# Patient Record
Sex: FEMALE | Race: WHITE | NOT HISPANIC OR LATINO | Employment: UNEMPLOYED | URBAN - METROPOLITAN AREA
[De-identification: names, ages, dates, MRNs, and addresses within clinical notes are randomized per-mention and may not be internally consistent; named-entity substitution may affect disease eponyms.]

---

## 2018-03-08 ENCOUNTER — HOSPITAL ENCOUNTER (EMERGENCY)
Facility: HOSPITAL | Age: 38
Discharge: HOME | End: 2018-03-08
Attending: EMERGENCY MEDICINE

## 2018-03-08 VITALS
RESPIRATION RATE: 16 BRPM | DIASTOLIC BLOOD PRESSURE: 63 MMHG | TEMPERATURE: 99 F | HEART RATE: 72 BPM | SYSTOLIC BLOOD PRESSURE: 130 MMHG | WEIGHT: 145 LBS | OXYGEN SATURATION: 95 %

## 2018-03-08 DIAGNOSIS — S61.511A LACERATION OF RIGHT WRIST, INITIAL ENCOUNTER: Primary | ICD-10-CM

## 2018-03-08 PROCEDURE — 12001 RPR S/N/AX/GEN/TRNK 2.5CM/<: CPT

## 2018-03-08 PROCEDURE — 90471 IMMUNIZATION ADMIN: CPT | Performed by: PHYSICIAN ASSISTANT

## 2018-03-08 PROCEDURE — 99283 EMERGENCY DEPT VISIT LOW MDM: CPT | Mod: 25

## 2018-03-08 PROCEDURE — 90715 TDAP VACCINE 7 YRS/> IM: CPT | Performed by: PHYSICIAN ASSISTANT

## 2018-03-08 PROCEDURE — 63600175 PHARM REV CODE 636 W HCPCS: Performed by: PHYSICIAN ASSISTANT

## 2018-03-08 PROCEDURE — 25000003 PHARM REV CODE 250: Performed by: PHYSICIAN ASSISTANT

## 2018-03-08 RX ORDER — METFORMIN HYDROCHLORIDE 500 MG/1
500 TABLET ORAL 2 TIMES DAILY WITH MEALS
COMMUNITY

## 2018-03-08 RX ORDER — LIDOCAINE HYDROCHLORIDE AND EPINEPHRINE 10; 10 MG/ML; UG/ML
10 INJECTION, SOLUTION INFILTRATION; PERINEURAL
Status: COMPLETED | OUTPATIENT
Start: 2018-03-08 | End: 2018-03-08

## 2018-03-08 RX ORDER — BACITRACIN 500 [USP'U]/G
OINTMENT TOPICAL
Status: COMPLETED | OUTPATIENT
Start: 2018-03-08 | End: 2018-03-08

## 2018-03-08 RX ORDER — ENALAPRIL MALEATE 10 MG/1
10 TABLET ORAL DAILY
COMMUNITY

## 2018-03-08 RX ORDER — ASPIRIN 81 MG/1
81 TABLET ORAL DAILY
COMMUNITY

## 2018-03-08 RX ORDER — GLIMEPIRIDE 2 MG/1
2 TABLET ORAL
COMMUNITY

## 2018-03-08 RX ORDER — SIMVASTATIN 20 MG/1
20 TABLET, FILM COATED ORAL NIGHTLY
COMMUNITY

## 2018-03-08 RX ADMIN — CLOSTRIDIUM TETANI TOXOID ANTIGEN (FORMALDEHYDE INACTIVATED), CORYNEBACTERIUM DIPHTHERIAE TOXOID ANTIGEN (FORMALDEHYDE INACTIVATED), BORDETELLA PERTUSSIS TOXOID ANTIGEN (GLUTARALDEHYDE INACTIVATED), BORDETELLA PERTUSSIS FILAMENTOUS HEMAGGLUTININ ANTIGEN (FORMALDEHYDE INACTIVATED), BORDETELLA PERTUSSIS PERTACTIN ANTIGEN, AND BORDETELLA PERTUSSIS FIMBRIAE 2/3 ANTIGEN 0.5 ML: 5; 2; 2.5; 5; 3; 5 INJECTION, SUSPENSION INTRAMUSCULAR at 06:03

## 2018-03-08 RX ADMIN — LIDOCAINE HYDROCHLORIDE AND EPINEPHRINE 10 ML: 10; 10 INJECTION, SOLUTION INFILTRATION; PERINEURAL at 06:03

## 2018-03-08 RX ADMIN — BACITRACIN: 500 OINTMENT TOPICAL at 06:03

## 2018-03-09 NOTE — ED TRIAGE NOTES
Pt arrived to ED with her sister.  used: pt's sister. Her sister states that patient got cut on her right wrist while cleaning .

## 2018-03-09 NOTE — ED PROVIDER NOTES
Encounter Date: 3/8/2018    SCRIBE #1 NOTE: I, Koffi Rice, am scribing for, and in the presence of, Carmen Gonzáles PA-C. Other sections scribed: HPI, ROS.       History     Chief Complaint   Patient presents with    Laceration     Laceration to right wrist from cleaning .     CC: Laceration  HPI: This 37 y.o. female with Hx of HTN and DM type II presents to the ED c/o 1.5 cm laceration to R medial wrist she sustained 1 hour PTA while cleaning out a [employer] . Pt reports mild-to-moderate associated pain to area. Bleeding was controlled PTA with pressure dressing. Pt does not believe she is up to date on her Tdap vaccination. Pt denies any numbness or weakness to hand, fever, chills.        The history is provided by the patient. The history is limited by a language barrier (Pashto). A  was used (sister).     Review of patient's allergies indicates:  No Known Allergies  Past Medical History:   Diagnosis Date    Diabetes mellitus     Hypertension      History reviewed. No pertinent surgical history.  History reviewed. No pertinent family history.  Social History   Substance Use Topics    Smoking status: Never Smoker    Smokeless tobacco: Not on file    Alcohol use No     Review of Systems   Constitutional: Negative for chills, diaphoresis, fatigue and fever.   HENT: Negative for congestion, ear pain, rhinorrhea and sore throat.    Eyes: Negative for pain.   Respiratory: Negative for cough.    Cardiovascular: Negative for chest pain.   Gastrointestinal: Negative for abdominal pain, constipation, diarrhea, nausea and vomiting.   Genitourinary: Negative for difficulty urinating, dysuria, flank pain, urgency, vaginal bleeding and vaginal discharge.   Musculoskeletal: Negative for back pain and neck pain.        (+) R wrist pain   Skin: Positive for wound (1.5 cm laceration to R medial wrist). Negative for pallor and rash.   Neurological: Negative for weakness and numbness.    Psychiatric/Behavioral: Negative for confusion.       Physical Exam     Initial Vitals [03/08/18 1752]   BP Pulse Resp Temp SpO2   (!) 143/70 69 16 98.2 °F (36.8 °C) 99 %      MAP       94.33         Physical Exam    Nursing note and vitals reviewed.  Constitutional: Vital signs are normal. She appears well-developed and well-nourished. She is not diaphoretic. She is cooperative.  Non-toxic appearance. She does not have a sickly appearance. She does not appear ill. No distress.   HENT:   Head: Normocephalic and atraumatic.   Right Ear: Tympanic membrane, external ear and ear canal normal.   Left Ear: Tympanic membrane, external ear and ear canal normal.   Nose: Nose normal.   Mouth/Throat: Uvula is midline, oropharynx is clear and moist and mucous membranes are normal. No trismus in the jaw. No uvula swelling. No oropharyngeal exudate, posterior oropharyngeal edema or posterior oropharyngeal erythema.   Eyes: Conjunctivae, EOM and lids are normal. Pupils are equal, round, and reactive to light.   Neck: Trachea normal, normal range of motion, full passive range of motion without pain and phonation normal. Neck supple.   Cardiovascular: Normal rate, regular rhythm, normal heart sounds and intact distal pulses. Exam reveals no gallop and no friction rub.    No murmur heard.  Pulses:       Radial pulses are 2+ on the right side, and 2+ on the left side.   Capillary refill less than 2 seconds in bilateral upper extremities.   Pulmonary/Chest: Effort normal and breath sounds normal. No respiratory distress. She has no decreased breath sounds. She has no wheezes. She has no rhonchi. She has no rales.   Abdominal: Soft. Normal appearance and bowel sounds are normal. She exhibits no distension and no mass. There is no tenderness. There is no rigidity, no rebound and no guarding.   Musculoskeletal: Normal range of motion.        Right elbow: Normal.       Right wrist: Normal.        Right forearm: She exhibits laceration.  She exhibits no tenderness, no bony tenderness, no swelling, no edema and no deformity.   Neurological: She is alert and oriented to person, place, and time. She has normal strength. No cranial nerve deficit or sensory deficit. GCS eye subscore is 4. GCS verbal subscore is 5. GCS motor subscore is 6.   Skin: Skin is warm and dry. Capillary refill takes less than 2 seconds. Laceration noted. No rash and no abscess noted.        Psychiatric: She has a normal mood and affect. Her speech is normal and behavior is normal. Judgment and thought content normal. Cognition and memory are normal.         ED Course   Lac Repair  Date/Time: 3/8/2018 9:11 PM  Performed by: SIRIA MOREIRA  Authorized by: CASTILLO MALIK   Consent Done: Yes  Consent: Verbal consent obtained.  Risks and benefits: risks, benefits and alternatives were discussed  Consent given by: patient  Patient understanding: patient states understanding of the procedure being performed  Patient consent: the patient's understanding of the procedure matches consent given  Patient identity confirmed: verbally with patient  Body area: upper extremity  Location details: right wrist  Laceration length: 1.5 cm  Foreign bodies: no foreign bodies  Tendon involvement: none  Nerve involvement: none  Vascular damage: no  Anesthesia: local infiltration    Anesthesia:  Local Anesthetic: lidocaine 1% with epinephrine  Anesthetic total: 2 mL  Patient sedated: no  Irrigation solution: saline  Irrigation method: jet lavage  Amount of cleaning: standard  Debridement: none  Degree of undermining: none  Skin closure: 5-0 Prolene  Number of sutures: 4  Technique: simple  Approximation: close  Approximation difficulty: simple  Dressing: antibiotic ointment and pressure/compression dressing  Patient tolerance: Patient tolerated the procedure well with no immediate complications        Labs Reviewed - No data to display          Medical Decision Making:   Initial Assessment:    This is an evaluation of a 37 y.o. female that presents to the Emergency Department for laceration. Patient is visiting her sister from [place] and her sister is her . 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 She reports she is right hand dominant. She denies any numbness, weakness or decreased ROM of the upper extremity. She denies any attempted treatment prior to arrival. She is unsure of her tetanus status.     ED Management:  Physical Exam shows a non-toxic, afebrile, and well appearing female. There is a superficial laceration on the right anterior wrist that is 1.5 cm that is easily approximated. No vascular damage. No tendon or bony exposure.    Vital Signs Are Reassuring. If available, previous records reviewed.     My overall impression is Laceration. I considered, but at this time, do not suspect abscess, cellulitis, foreign body.    ED Course: Tdap. Laceration repair. Patient tolerated procedure well with no immediate complications. Bandage applied to wound. D/C Meds: Bacitracin oint. Additional D/C Information: wound care instructions given. Instructed patient to follow-up with PCP or this ED for suture removal in 7-10 days. The diagnosis, treatment plan, instructions for follow-up and reevaluation with PCP, as well as ED return precautions were discussed and understanding was verbalized. All questions or concerns have been addressed. Patient was discharged home with an instructional sheet which gave not only information regarding the most likely diagnoses but also information regarding when to return to the emergency department for alarming symptoms and when to seek further care.        Other:   I have discussed this case with another health care provider.       <> Summary of the Discussion:   This case was discussed with Dr. Peraza who is in agreement with my assessment and plan.     PHYLICIA Friedmanibe Attestation:   Scribe #1: I  performed the above scribed service and the documentation accurately describes the services I performed. I attest to the accuracy of the note.    Attending Attestation:           Physician Attestation for Scribe:  Physician Attestation Statement for Scribe #1: I, Carmen Stevens PA-C, reviewed documentation, as scribed by Koffi Rice in my presence, and it is both accurate and complete.                    Clinical Impression:   The encounter diagnosis was Laceration of right wrist, initial encounter.    Disposition:   Disposition: Discharged  Condition: Stable                        Carmen Stevens PA-C  03/08/18 [id number]

## 2018-03-09 NOTE — DISCHARGE INSTRUCTIONS
Please keep your wound clean and dry.  Wash gently with soap and water and apply antibiotic ointment (bacitracin, neosporin, etc.) over the wound after washing. Please watch for signs of infection including: increased\spreading redness, swelling, pus-like discharge, or a fever greater than 100.4F. If you experience any of these, please contact your Primary Care Doctor or Return to the Emergency Department for a wound check.     Please follow up with your Primary Care Doctor in 7-10 days for wound recheck and suture removal.  You may return to the Emergency Department if you are unable to see your Primary Care Doctor.  Please return to the ER for any new or worsening symptoms.

## 2018-03-17 ENCOUNTER — HOSPITAL ENCOUNTER (EMERGENCY)
Facility: HOSPITAL | Age: 38
Discharge: HOME | End: 2018-03-17
Attending: EMERGENCY MEDICINE

## 2018-03-17 VITALS
HEIGHT: 61 IN | HEART RATE: 72 BPM | TEMPERATURE: 98 F | BODY MASS INDEX: 27.38 KG/M2 | WEIGHT: 145 LBS | OXYGEN SATURATION: 99 % | RESPIRATION RATE: 18 BRPM | SYSTOLIC BLOOD PRESSURE: 138 MMHG | DIASTOLIC BLOOD PRESSURE: 72 MMHG

## 2018-03-17 DIAGNOSIS — Z48.02 VISIT FOR SUTURE REMOVAL: Primary | ICD-10-CM

## 2018-03-17 PROCEDURE — 99281 EMR DPT VST MAYX REQ PHY/QHP: CPT

## 2018-03-18 NOTE — ED PROVIDER NOTES
Encounter Date: 3/17/2018    SCRIBE #1 NOTE: I, Yoon Mancuso, am scribing for, and in the presence of,  Shaan Nj MD. I have scribed the following portions of the note - Other sections scribed: HPI, ROS, PE.       History     Chief Complaint   Patient presents with    Suture / Staple Removal     Pt is here to have stiches removed (was here on March 8, 2018).     CC: Suture Removal    HPI: 37 year old female with DM and HTN presents to the ED for suture removal from the R forearm that was placed 9 days ago on 3/8/18. Pt reports she sustained the laceration while cleaning a [employer] . No further complaints reported. Pt denies fever, swelling to the suture site, or purulent drainage.      The history is provided by the patient. No  was used.     Review of patient's allergies indicates:  No Known Allergies  Past Medical History:   Diagnosis Date    Diabetes mellitus     Hypertension      History reviewed. No pertinent surgical history.  History reviewed. No pertinent family history.  Social History   Substance Use Topics    Smoking status: Never Smoker    Smokeless tobacco: Not on file    Alcohol use No     Review of Systems   Constitutional: Negative for chills, diaphoresis and fever.   HENT: Negative for ear pain and sore throat.    Eyes: Negative for photophobia and visual disturbance.   Respiratory: Negative for cough and shortness of breath.    Cardiovascular: Negative for chest pain.   Gastrointestinal: Negative for abdominal pain, diarrhea, nausea and vomiting.   Genitourinary: Negative for dysuria.   Musculoskeletal: Negative for back pain.   Skin: Negative for rash.        (+) sutures placed to the R forearm   Neurological: Negative for headaches.   All other systems reviewed and are negative.      Physical Exam     Initial Vitals [03/17/18 2055]   BP Pulse Resp Temp SpO2   122/73 95 16 98 °F (36.7 °C) 97 %      MAP       89.33         Physical Exam   PHYSICAL EXAM  Vital  signs and nurse note reviewed.    GEN:  NAD, A & O X 3, atraumatic, well appearing, nontoxic  HEENT:   PERRLA, EOMI, nl conjunciva, no node/nodule, neck supple, no rigidity, moist membranes  CV:   2+ radial pulses, <2 sec cap refill, no obvious JVD  CHEST/Resp: equal and bilateral chest rise, no obvious wheezing  ABD:     Soft, nondistended  EXT:   +3cm scab with 3 blue sutures placed to the R dorsal distal surface of the R forearm, MARQUEZ x 4, FROM, no swelling  NEURO:  CN II-XII grossly intact, no obvious motor/sensory deficit  PSYCH:  no SI/HI, no anxiety, nl mood/affect  SKIN:  (see EXT) Warm, dry, intact, no rashes/lesions or masses        ED Course   Procedures  Labs Reviewed - No data to display          Medical Decision Making:   Initial Assessment:   37-year-old woman who had stitches placed for laceration on a  90s ago presents for suture removal.  ED Management:  Treatment plan includes physical exam,  and supportive care.    Patient improved after treatment sutures removed and tolerating PO.    Family and patient updated on care.  Pt agrees with assessment, disposition and treatment plan and has no further questions or complaints at this time. Given return precautions and demonstrates understanding.    Patient will  follow up with primary care physician as an outpatient.  Prescription given: NONE            Scribe Attestation:   Scribe #1: I performed the above scribed service and the documentation accurately describes the services I performed. I attest to the accuracy of the note.    Attending Attestation:           Physician Attestation for Scribe:  Physician Attestation Statement for Scribe #1: I, Shaan Nj MD, reviewed documentation, as scribed by Yoon Mancuso in my presence, and it is both accurate and complete.                    Clinical Impression:   The encounter diagnosis was Visit for suture removal.    Disposition:   Disposition: Discharged  Condition: Stable                         Shaan Nj MD  03/19/18 7725